# Patient Record
Sex: FEMALE | Race: AMERICAN INDIAN OR ALASKA NATIVE | NOT HISPANIC OR LATINO | URBAN - METROPOLITAN AREA
[De-identification: names, ages, dates, MRNs, and addresses within clinical notes are randomized per-mention and may not be internally consistent; named-entity substitution may affect disease eponyms.]

---

## 2017-05-05 ENCOUNTER — NEW PATIENT (OUTPATIENT)
Dept: URBAN - METROPOLITAN AREA CLINIC 64 | Facility: CLINIC | Age: 77
End: 2017-05-05
Payer: MEDICARE

## 2017-05-05 DIAGNOSIS — H25.13 AGE-RELATED NUCLEAR CATARACT, BILATERAL: Primary | ICD-10-CM

## 2017-05-05 DIAGNOSIS — H52.03 HYPERMETROPIA, BILATERAL: ICD-10-CM

## 2017-05-05 PROCEDURE — 92004 COMPRE OPH EXAM NEW PT 1/>: CPT | Performed by: OPTOMETRIST

## 2017-05-05 PROCEDURE — 92015 DETERMINE REFRACTIVE STATE: CPT | Performed by: OPTOMETRIST

## 2017-05-05 ASSESSMENT — INTRAOCULAR PRESSURE
OD: 15
OS: 15

## 2017-05-05 ASSESSMENT — VISUAL ACUITY
OD: 20/20
OS: 20/20

## 2017-05-05 ASSESSMENT — KERATOMETRY
OD: 41.16
OS: 2122.2

## 2021-05-05 ENCOUNTER — OFFICE VISIT (OUTPATIENT)
Dept: URBAN - METROPOLITAN AREA CLINIC 64 | Facility: CLINIC | Age: 81
End: 2021-05-05
Payer: MEDICARE

## 2021-05-05 PROCEDURE — 99203 OFFICE O/P NEW LOW 30 MIN: CPT | Performed by: OPHTHALMOLOGY

## 2021-05-05 ASSESSMENT — INTRAOCULAR PRESSURE
OD: 12
OS: 14

## 2021-05-05 NOTE — IMPRESSION/PLAN
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 Plan: Discussed diagnosis in detail with patient. Advised pt to have lesion removed from right upper eyelid and send culture to lab for testing to rule out squamous cell carcinoma. Pt to schedule with Dr. Addie Castro next week with same day excision of lesion removal of upper right eyelid.

## 2021-05-14 ENCOUNTER — PROCEDURE (OUTPATIENT)
Dept: URBAN - METROPOLITAN AREA CLINIC 64 | Facility: CLINIC | Age: 81
End: 2021-05-14
Payer: MEDICARE

## 2021-05-14 DIAGNOSIS — C43.111: Primary | ICD-10-CM

## 2021-05-14 PROCEDURE — 67840 REMOVE EYELID LESION: CPT | Performed by: STUDENT IN AN ORGANIZED HEALTH CARE EDUCATION/TRAINING PROGRAM

## 2021-05-14 PROCEDURE — 99214 OFFICE O/P EST MOD 30 MIN: CPT | Performed by: STUDENT IN AN ORGANIZED HEALTH CARE EDUCATION/TRAINING PROGRAM

## 2021-05-14 RX ORDER — ERYTHROMYCIN 5 MG/G
OINTMENT OPHTHALMIC
Qty: 5 | Refills: 0 | Status: ACTIVE
Start: 2021-05-14

## 2021-05-14 NOTE — IMPRESSION/PLAN
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 Plan: Exam concerning for neoplastic growth vs seborreic keratosis. Plan for excision with biopsy and large margins today in office. Send for histopath. Follow up x 1-2 months. Larger pigmentation patch has been there her whole life and stable per daughter. PROCEDURE:

Time out performed, consent obtained. Eye prepped and draped. Area of lesion excised with sharp dissection and sample placed in specimen container. Skin closed with 6-0 simple interrupted plain gut. Erythromycin applied. Eye was cleaned. Instructions and follow up apt made at check out.

## 2021-07-14 ENCOUNTER — OFFICE VISIT (OUTPATIENT)
Dept: URBAN - METROPOLITAN AREA CLINIC 64 | Facility: CLINIC | Age: 81
End: 2021-07-14
Payer: MEDICARE

## 2021-07-14 DIAGNOSIS — H01.111 ALLERGIC DERMATITIS OF RIGHT UPPER EYELID: Primary | ICD-10-CM

## 2021-07-14 PROCEDURE — 99211 OFF/OP EST MAY X REQ PHY/QHP: CPT | Performed by: OPHTHALMOLOGY

## 2021-07-14 ASSESSMENT — INTRAOCULAR PRESSURE
OS: 12
OD: 12

## 2021-07-15 NOTE — IMPRESSION/PLAN
Impression: Allergic dermatitis of right upper eyelid: H01.111. Plan: Discussed diagnosis in detail with patient. Advised patient of condition. Pt to try Hydrocortisone cream on eyelids. Refer to PCP for possible allergy pill prescription.  RTC 6 months